# Patient Record
Sex: FEMALE | Race: WHITE | NOT HISPANIC OR LATINO | Employment: PART TIME | URBAN - NONMETROPOLITAN AREA
[De-identification: names, ages, dates, MRNs, and addresses within clinical notes are randomized per-mention and may not be internally consistent; named-entity substitution may affect disease eponyms.]

---

## 2018-01-15 ENCOUNTER — APPOINTMENT (EMERGENCY)
Dept: CT IMAGING | Facility: HOSPITAL | Age: 28
End: 2018-01-15
Payer: COMMERCIAL

## 2018-01-15 ENCOUNTER — HOSPITAL ENCOUNTER (EMERGENCY)
Facility: HOSPITAL | Age: 28
Discharge: HOME | End: 2018-01-15
Admitting: EMERGENCY MEDICINE
Payer: COMMERCIAL

## 2018-01-15 VITALS
DIASTOLIC BLOOD PRESSURE: 68 MMHG | HEART RATE: 91 BPM | HEIGHT: 62 IN | OXYGEN SATURATION: 98 % | BODY MASS INDEX: 42.77 KG/M2 | TEMPERATURE: 98.5 F | SYSTOLIC BLOOD PRESSURE: 106 MMHG | RESPIRATION RATE: 21 BRPM | WEIGHT: 232.44 LBS

## 2018-01-15 DIAGNOSIS — R06.89 DYSPNEA AND RESPIRATORY ABNORMALITY: ICD-10-CM

## 2018-01-15 DIAGNOSIS — R19.7 NAUSEA VOMITING AND DIARRHEA: ICD-10-CM

## 2018-01-15 DIAGNOSIS — R06.00 DYSPNEA AND RESPIRATORY ABNORMALITY: ICD-10-CM

## 2018-01-15 DIAGNOSIS — R11.2 NAUSEA VOMITING AND DIARRHEA: ICD-10-CM

## 2018-01-15 DIAGNOSIS — Z34.92 SECOND TRIMESTER PREGNANCY: ICD-10-CM

## 2018-01-15 DIAGNOSIS — E86.0 DEHYDRATION: Primary | ICD-10-CM

## 2018-01-15 LAB
ALBUMIN SERPL BCP-MCNC: 3.3 G/DL (ref 3.5–5)
ALP SERPL-CCNC: 59 U/L (ref 46–116)
ALT SERPL W P-5'-P-CCNC: 47 U/L (ref 12–78)
ANION GAP SERPL CALCULATED.3IONS-SCNC: 7 MMOL/L (ref 4–13)
AST SERPL W P-5'-P-CCNC: 20 U/L (ref 5–45)
BASOPHILS # BLD MANUAL: 0 THOUSAND/UL (ref 0–0.1)
BASOPHILS NFR MAR MANUAL: 0 % (ref 0–1)
BILIRUB SERPL-MCNC: 0.3 MG/DL (ref 0.2–1)
BILIRUB UR QL STRIP: NEGATIVE
BUN SERPL-MCNC: 6 MG/DL (ref 5–25)
CALCIUM SERPL-MCNC: 8.6 MG/DL (ref 8.3–10.1)
CHLORIDE SERPL-SCNC: 102 MMOL/L (ref 100–108)
CLARITY UR: CLEAR
CO2 SERPL-SCNC: 24 MMOL/L (ref 21–32)
COLOR UR: YELLOW
CREAT SERPL-MCNC: 0.55 MG/DL (ref 0.6–1.3)
EOSINOPHIL # BLD MANUAL: 0 THOUSAND/UL (ref 0–0.4)
EOSINOPHIL NFR BLD MANUAL: 0 % (ref 0–6)
ERYTHROCYTE [DISTWIDTH] IN BLOOD BY AUTOMATED COUNT: 12.8 % (ref 11.6–15.1)
GFR SERPL CREATININE-BSD FRML MDRD: 130 ML/MIN/1.73SQ M
GLUCOSE SERPL-MCNC: 80 MG/DL (ref 65–140)
GLUCOSE UR STRIP-MCNC: NEGATIVE MG/DL
HCT VFR BLD AUTO: 37.4 % (ref 34.8–46.1)
HGB BLD-MCNC: 13.4 G/DL (ref 11.5–15.4)
HGB UR QL STRIP.AUTO: NEGATIVE
HOLD SPECIMEN: NORMAL
KETONES UR STRIP-MCNC: ABNORMAL MG/DL
LEUKOCYTE ESTERASE UR QL STRIP: NEGATIVE
LYMPHOCYTES # BLD AUTO: 0.25 THOUSAND/UL (ref 0.6–4.47)
LYMPHOCYTES # BLD AUTO: 4 % (ref 14–44)
MCH RBC QN AUTO: 30.2 PG (ref 26.8–34.3)
MCHC RBC AUTO-ENTMCNC: 35.8 G/DL (ref 31.4–37.4)
MCV RBC AUTO: 84 FL (ref 82–98)
MONOCYTES # BLD AUTO: 0.25 THOUSAND/UL (ref 0–1.22)
MONOCYTES NFR BLD: 4 % (ref 4–12)
NEUTROPHILS # BLD MANUAL: 5.68 THOUSAND/UL (ref 1.85–7.62)
NEUTS SEG NFR BLD AUTO: 92 % (ref 43–75)
NITRITE UR QL STRIP: NEGATIVE
PH UR STRIP.AUTO: 5.5 [PH] (ref 4.5–8)
PLATELET # BLD AUTO: 211 THOUSANDS/UL (ref 149–390)
PLATELET BLD QL SMEAR: ADEQUATE
PMV BLD AUTO: 9.3 FL (ref 8.9–12.7)
POTASSIUM SERPL-SCNC: 3.5 MMOL/L (ref 3.5–5.3)
PROT SERPL-MCNC: 6.9 G/DL (ref 6.4–8.2)
PROT UR STRIP-MCNC: NEGATIVE MG/DL
RBC # BLD AUTO: 4.43 MILLION/UL (ref 3.81–5.12)
SODIUM SERPL-SCNC: 133 MMOL/L (ref 136–145)
SP GR UR STRIP.AUTO: <=1.005 (ref 1–1.03)
TOTAL CELLS COUNTED SPEC: 100
TROPONIN I SERPL-MCNC: <0.02 NG/ML
UROBILINOGEN UR QL STRIP.AUTO: 0.2 E.U./DL
WBC # BLD AUTO: 6.17 THOUSAND/UL (ref 4.31–10.16)

## 2018-01-15 PROCEDURE — 80053 COMPREHEN METABOLIC PANEL: CPT | Performed by: PHYSICIAN ASSISTANT

## 2018-01-15 PROCEDURE — 96360 HYDRATION IV INFUSION INIT: CPT

## 2018-01-15 PROCEDURE — 84484 ASSAY OF TROPONIN QUANT: CPT | Performed by: PHYSICIAN ASSISTANT

## 2018-01-15 PROCEDURE — 93005 ELECTROCARDIOGRAM TRACING: CPT

## 2018-01-15 PROCEDURE — 85027 COMPLETE CBC AUTOMATED: CPT | Performed by: PHYSICIAN ASSISTANT

## 2018-01-15 PROCEDURE — 81003 URINALYSIS AUTO W/O SCOPE: CPT | Performed by: PHYSICIAN ASSISTANT

## 2018-01-15 PROCEDURE — 96361 HYDRATE IV INFUSION ADD-ON: CPT

## 2018-01-15 PROCEDURE — 85007 BL SMEAR W/DIFF WBC COUNT: CPT | Performed by: PHYSICIAN ASSISTANT

## 2018-01-15 PROCEDURE — 93005 ELECTROCARDIOGRAM TRACING: CPT | Performed by: PHYSICIAN ASSISTANT

## 2018-01-15 PROCEDURE — 71275 CT ANGIOGRAPHY CHEST: CPT

## 2018-01-15 PROCEDURE — 36415 COLL VENOUS BLD VENIPUNCTURE: CPT | Performed by: PHYSICIAN ASSISTANT

## 2018-01-15 PROCEDURE — 99285 EMERGENCY DEPT VISIT HI MDM: CPT

## 2018-01-15 RX ORDER — PYRIDOXINE HCL (VITAMIN B6) 50 MG
50 TABLET ORAL ONCE
Status: COMPLETED | OUTPATIENT
Start: 2018-01-15 | End: 2018-01-15

## 2018-01-15 RX ORDER — PYRIDOXINE HCL (VITAMIN B6) 50 MG
50 TABLET ORAL DAILY
Status: DISCONTINUED | OUTPATIENT
Start: 2018-01-15 | End: 2018-01-15

## 2018-01-15 RX ADMIN — SODIUM CHLORIDE 1000 ML: 0.9 INJECTION, SOLUTION INTRAVENOUS at 12:54

## 2018-01-15 RX ADMIN — IODIXANOL 85 ML: 320 INJECTION, SOLUTION INTRAVASCULAR at 13:22

## 2018-01-15 RX ADMIN — Medication 50 MG: at 13:20

## 2018-01-15 RX ADMIN — DOXYLAMINE SUCCINATE 12.5 MG: 25 TABLET ORAL at 13:20

## 2018-01-15 NOTE — ED NOTES
Pt resting comfortable  Minimal nausea  No complications nor vomiting while eating  Call bell within reach   Family at bedside     Dyllan Johnston, [address] [facility]  01/15/18 6648

## 2018-01-15 NOTE — ED NOTES
Pt was eating upon entering room with no complications  Ambulating to bathroom with no assistance        Chidi Silver RN  01/15/18 1506

## 2018-01-15 NOTE — DISCHARGE INSTRUCTIONS
Acute Nausea and Vomiting, Ambulatory Care   GENERAL INFORMATION:   Acute nausea and vomiting  starts suddenly, gets worse quickly, and lasts a short time  Nausea and vomiting may be caused by pregnancy, alcohol, infection, or medicines  Common related symptoms include the following:   · Fever    · Abdominal swelling    · Pain, tenderness, or a lump in the abdomen    · Splashing sounds heard in your stomach when you move  Seek immediate care for the following symptoms:   · Blood in your vomit or bowel movements    · Sudden, severe pain in your chest and upper abdomen after hard vomiting    · Dizziness, dry mouth, and thirst    · Urinating very little or not at all    · Muscle weakness, leg cramps, and trouble breathing    · A heart beat that is faster than normal    · Vomiting for more than 48 hours  Treatment for acute nausea and vomiting  may include medicines to calm your stomach and stop the vomiting  You may need IV fluids if you are dehydrated  Manage your nausea and vomiting:   · Drink liquids as directed to prevent dehydration  Ask how much liquid to drink each day and which liquids are best for you  You may need to drink an oral rehydration solution (ORS)  ORS contains water, salts, and sugar that are needed to replace the lost body fluids  Ask what kind of ORS to use, how much to drink, and where to get it  · Eat smaller meals, more often  Eat small amounts of food every 2 to 3 hours, even if you are not hungry  Food in your stomach may help decrease your nausea  · Avoid stress  Find ways to relax and manage your stress  Headaches due to stress may cause nausea and vomiting  Get more rest and sleep  Follow up with your healthcare provider as directed:  Write down your questions so you remember to ask them during your visits  CARE AGREEMENT:   You have the right to help plan your care  Learn about your health condition and how it may be treated   Discuss treatment options with your caregivers to decide what care you want to receive  You always have the right to refuse treatment  The above information is an  only  It is not intended as medical advice for individual conditions or treatments  Talk to your doctor, nurse or pharmacist before following any medical regimen to see if it is safe and effective for you  © 2014 [address] is for End User's use only and may not be sold, redistributed or otherwise used for commercial purposes  All illustrations and images included in CareNotes® are the copyrighted property of [employer]  or Ji Garcias  Dehydration   WHAT YOU NEED TO KNOW:   Dehydration is a condition that develops when your body does not have enough fluid  You may become dehydrated if you do not drink enough water or lose too much fluid  Fluid loss may also cause loss of electrolytes (minerals), such as sodium  DISCHARGE INSTRUCTIONS:   Return to the emergency department if:   · You have a seizure  · You are confused or cannot think clearly  · You are extremely sleepy, or another person cannot wake you  · You become dizzy or faint when you stand  · You are not able to urinate  · You have trouble breathing  · You have a fast or irregular heartbeat  · Your hands or feet are cold, or your face is pale  Contact your healthcare provider if:   · You have trouble drinking liquids because you are vomiting  · Your symptoms get worse  · You have a fever  · You feel very weak or tired  · You have questions or concerns about your condition or care  Follow up with your healthcare provider as directed:  Write down your questions so you remember to ask them during your visits  Prevent or manage dehydration:   · Drink liquids as directed  Liquids that contain water, sugar, and minerals can help your body hold in fluid and help prevent dehydration  Drink liquids throughout the day, not just when you feel thirsty  Men should drink about 3 liters (13 eight-ounce cups) of liquid each day  Women should drink about 2 liters (9 eight-ounce cups) of liquid each day  Drink even more liquid if you will be outdoors, in the sun for a long time, or exercising  · Stay cool  Limit the time you spend outdoors during the hottest part of the day  Dress in lightweight clothes  · Keep track of how often you urinate  If you urinate less than usual or your urine is darker, drink more liquids  © 2017 [address] [facility] Information is for End User's use only and may not be sold, redistributed or otherwise used for commercial purposes  All illustrations and images included in CareNotes® are the copyrighted property of [employer]  or Ji Garcias  The above information is an  only  It is not intended as medical advice for individual conditions or treatments  Talk to your doctor, nurse or pharmacist before following any medical regimen to see if it is safe and effective for you  Dyspnea   WHAT YOU NEED TO KNOW:   Dyspnea is breathing difficulty or discomfort  You may have labored, painful, or shallow breathing  You may feel breathless or short of breath  Dyspnea can occur during rest or with activity  You may have dyspnea for a short time, or it might become chronic  Dyspnea is often a symptom of a disease or condition  DISCHARGE INSTRUCTIONS:   Return to the emergency department if:   · Your signs and symptoms are the same or worse within 24 hours of treatment  · You have shaking chills or a fever over 102°F      · You have new pain, pressure, or tightness in your chest      · You have a new or worse cough or wheezing, or you cough up blood  · You feel like you cannot get enough air  · The skin over your ribs or on your neck sinks in when you breathe  · You have a severe headache with vomiting and abdominal pain  · You feel confused or dizzy    Contact your healthcare provider or specialist if:   · You have questions or concerns about your condition or care  Medicines:   · Medicines  may be used to treat the cause of your dyspnea  Medicines may reduce swelling in your airway or decrease extra fluid from around your heart or lungs  Other medicines may be used to decrease anxiety and help you feel calm and relaxed  · Take your medicine as directed  Contact your healthcare provider if you think your medicine is not helping or if you have side effects  Tell him or her if you are allergic to any medicine  Keep a list of the medicines, vitamins, and herbs you take  Include the amounts, and when and why you take them  Bring the list or the pill bottles to follow-up visits  Carry your medicine list with you in case of an emergency  Manage long-term dyspnea:   · Create an action plan  You and your healthcare provider can work together to create a plan for how to handle episodes of dyspnea  The plan can include daily activities, treatment changes, and what to do if you have severe breathing problems  · Lean forward on your elbows when you sit  This helps your lungs expand and may make it easier to breathe  · Use pursed-lip breathing any time you feel short of breath  Breathe in through your nose and then slowly breathe out through your mouth with your lips slightly puckered  It should take you twice as long to breathe out as it did to breathe in  · Do not smoke  Nicotine and other chemicals in cigarettes and cigars can cause lung damage and make it harder to breathe  Ask your healthcare provider for information if you currently smoke and need help to quit  E-cigarettes or smokeless tobacco still contain nicotine  Talk to your healthcare provider before you use these products  · Reach or maintain a healthy weight  Your healthcare provider can help you create a safe weight loss plan if you are overweight  · Exercise as directed    Exercise can help your lungs work more easily  Exercise can also help you lose weight if needed  Try to get at least 30 minutes of exercise most days of the week  Your healthcare provider can help you create an exercise plan that is safe for you  Follow up with your healthcare provider or specialist as directed:  Write down your questions so you remember to ask them during your visits  © 2017 [address]  Information is for End User's use only and may not be sold, redistributed or otherwise used for commercial purposes  All illustrations and images included in CareNotes® are the copyrighted property of [employer]  or [address]  The above information is an  only  It is not intended as medical advice for individual conditions or treatments  Talk to your doctor, nurse or pharmacist before following any medical regimen to see if it is safe and effective for you

## 2018-01-16 LAB
ATRIAL RATE: 91 BPM
P AXIS: 36 DEGREES
PR INTERVAL: 146 MS
QRS AXIS: 48 DEGREES
QRSD INTERVAL: 92 MS
QT INTERVAL: 358 MS
QTC INTERVAL: 440 MS
T WAVE AXIS: 19 DEGREES
VENTRICULAR RATE: 91 BPM

## 2018-01-16 NOTE — ED PROVIDER NOTES
History  Chief Complaint   Patient presents with    Shortness of Breath     C/o Sob for 1 week  Nausea and vomiting started last night   diarrhea  19 weeks pregnant  History provided by:  Patient and parent  Shortness of Breath   Severity:  Mild  Onset quality:  Gradual  Duration:  1 week  Timing:  Intermittent  Progression:  Unchanged  Chronicity:  New  Context: activity and URI (had a cold since being on vacation in early december sx resolved except dry cough persists)    Relieved by:  Rest  Worsened by: Activity and exertion  Ineffective treatments:  None tried  Associated symptoms: vomiting    Associated symptoms: no abdominal pain, no chest pain, no cough, no diaphoresis, no ear pain, no fever, no headaches, no hemoptysis, no rash, no sore throat and no syncope    Risk factors: obesity    Risk factors: no family hx of DVT and no hx of PE/DVT    Risk factors comment:  +19 weeks pregnant  +airplane flight 5 days ago from [place] to [place]  Nausea   The primary symptoms include nausea, vomiting and diarrhea  Primary symptoms do not include fever, fatigue, abdominal pain, melena, jaundice, hematochezia, dysuria, myalgias, arthralgias or rash  Primary symptoms comment: sick contacts at home with uri/bronchitis/diarrhea  The illness began yesterday  The onset was gradual  The problem has been gradually improving  Nausea began yesterday  Associated with: no change with liquids or solids  pt is pregnant but no previous morning sickness  The vomiting began yesterday  Frequency: 2-3 episodes  The diarrhea began yesterday  The diarrhea is semi-solid  The diarrhea occurs 2 to 4 times per day  The illness does not include chills, anorexia, bloating, constipation or back pain  Associated medical issues do not include inflammatory bowel disease or irritable bowel syndrome  Prior to Admission Medications   Prescriptions Last Dose Informant Patient Reported? Taking?    Prenatal Vit-Iron Carbonyl-FA (PRENATAL MULTIVITAMIN) TABS   Yes Yes   Sig: Take 1 tablet by mouth daily      Facility-Administered Medications: None       Past Medical History:   Diagnosis Date    Renal disorder     kidney stone       History reviewed  No pertinent surgical history  History reviewed  No pertinent family history  I have reviewed and agree with the history as documented  Social History   Substance Use Topics    Smoking status: Former Smoker    Smokeless tobacco: Never Used    Alcohol use No        Review of Systems   Constitutional: Negative for activity change, appetite change, chills, diaphoresis, fatigue, fever and unexpected weight change  HENT: Negative for congestion, ear pain, postnasal drip, rhinorrhea, sinus pressure and sore throat  Eyes: Negative for pain, discharge and redness  Respiratory: Positive for shortness of breath  Negative for cough, hemoptysis and chest tightness  Cardiovascular: Negative for chest pain, palpitations, leg swelling and syncope  Gastrointestinal: Positive for diarrhea, nausea and vomiting  Negative for abdominal pain, anorexia, bloating, constipation, hematochezia, jaundice and melena  Genitourinary: Negative for difficulty urinating, dysuria, flank pain, frequency, hematuria and urgency  Musculoskeletal: Negative for arthralgias, back pain and myalgias  Skin: Negative for color change, rash and wound  Allergic/Immunologic: Negative for immunocompromised state  Neurological: Negative for dizziness, tremors, syncope, weakness, numbness and headaches         Physical Exam  ED Triage Vitals [01/15/18 1138]   Temperature Pulse Respirations Blood Pressure SpO2   98 5 °F (36 9 °C) 92 18 115/53 99 %      Temp Source Heart Rate Source Patient Position - Orthostatic VS BP Location FiO2 (%)   Temporal Monitor Sitting Left arm --      Pain Score       No Pain           Orthostatic Vital Signs  Vitals:    01/15/18 1400 01/15/18 1415 01/15/18 1430 01/15/18 1445   BP: 118/60 102/63 106/68   Pulse: 94 91 96 91   Patient Position - Orthostatic VS:   Sitting        Physical Exam   Constitutional: She is oriented to person, place, and time  Vital signs are normal  She appears well-developed and well-nourished  Non-toxic appearance  No distress  HENT:   Head: Normocephalic and atraumatic  Right Ear: Tympanic membrane and external ear normal    Left Ear: Tympanic membrane and external ear normal    Nose: Nose normal  No rhinorrhea  Mouth/Throat: Uvula is midline and oropharynx is clear and moist    Eyes: Conjunctivae, EOM and lids are normal  Pupils are equal, round, and reactive to light  Right eye exhibits no discharge  Left eye exhibits no discharge  Neck: Normal range of motion and full passive range of motion without pain  Neck supple  No JVD present  No thyromegaly present  Cardiovascular: Normal rate, regular rhythm, normal heart sounds and intact distal pulses  No murmur heard  Pulmonary/Chest: Effort normal and breath sounds normal  No accessory muscle usage  No tachypnea  No respiratory distress  She has no wheezes  She has no rales  She exhibits no tenderness  Abdominal: Soft  Normal appearance and bowel sounds are normal  She exhibits no distension  There is no hepatosplenomegaly  There is no tenderness  There is no rebound and no CVA tenderness  No hernia  Musculoskeletal: Normal range of motion  She exhibits no edema or tenderness  Negative krzysztof's sign   Lymphadenopathy:     She has no cervical adenopathy  Neurological: She is alert and oriented to person, place, and time  No cranial nerve deficit or sensory deficit  Skin: Skin is warm, dry and intact  Capillary refill takes less than 2 seconds  No rash noted  She is not diaphoretic  No erythema  No pallor  Psychiatric: She has a normal mood and affect  Her speech is normal and behavior is normal    Nursing note and vitals reviewed        ED Medications  Medications   sodium chloride 0 9 % bolus 1,000 mL (0 mL Intravenous Stopped 1/15/18 1455)   doxylamine (UNISON) tablet 12 5 mg (12 5 mg Oral Given 1/15/18 1320)   pyridoxine (VITAMIN B6) tablet 50 mg (50 mg Oral Given 1/15/18 1320)   iodixanol (VISIPAQUE) 320 MG/ML injection 85 mL (85 mL Intravenous Given 1/15/18 1322)       Diagnostic Studies  Results Reviewed     Procedure Component Value Units Date/Time    [place] draw [[id number]] Collected:  01/15/18 1205    Lab Status:  Final result Specimen:  Blood Updated:  01/15/18 1404    Narrative: The following orders were created for panel order [place] draw  Procedure                               Abnormality         Status                     ---------                               -----------         ------                     Riki Pinks Top on [facility][[id number]]                            Final result               Green / Black tube on hold[[id number]]                        Final result                 Please view results for these tests on the individual orders  UA w Reflex to Microscopic w Reflex to Culture [[id number]]  (Abnormal) Collected:  01/15/18 1330    Lab Status:  Final result Specimen:  Urine Updated:  01/15/18 1343     Color, UA Yellow     Clarity, UA Clear     Specific Gravity, UA <=1 005     pH, UA 5 5     Leukocytes, UA Negative     Nitrite, UA Negative     Protein, UA Negative mg/dl      Glucose, UA Negative mg/dl      Ketones, UA 40 (2+) (A) mg/dl      Urobilinogen, UA 0 2 E U /dl      Bilirubin, UA Negative     Blood, UA Negative    CBC and differential [[id number]]  (Normal) Collected:  01/15/18 1205    Lab Status:  Final result Specimen:  Blood from Arm, Left Updated:  01/15/18 1246     WBC 6 17 Thousand/uL      RBC 4 43 Million/uL      Hemoglobin 13 4 g/dL      Hematocrit 37 4 %      MCV 84 fL      MCH 30 2 pg      MCHC 35 8 g/dL      RDW 12 8 %      MPV 9 3 fL      Platelets 271 Thousands/uL     Narrative: This is an appended report    These results have been appended to a previously verified report  Troponin I [[id number]]  (Normal) Collected:  01/15/18 1205    Lab Status:  Final result Specimen:  Blood from Arm, Left Updated:  01/15/18 1235     Troponin I <0 02 ng/mL     Narrative:         [employer] Chemistry analyzer 99% cutoff is > 0 04 ng/mL in network labs    o cTnI 99% cutoff is useful only when applied to patients in the clinical setting of myocardial ischemia  o cTnI 99% cutoff should be interpreted in the context of clinical history, ECG findings and possibly cardiac imaging to establish correct diagnosis  o cTnI 99% cutoff may be suggestive but clearly not indicative of a coronary event without the clinical setting of myocardial ischemia  Comprehensive metabolic panel [[id number]]  (Abnormal) Collected:  01/15/18 1205    Lab Status:  Final result Specimen:  Blood from Arm, Left Updated:  01/15/18 1230     Sodium 133 (L) mmol/L      Potassium 3 5 mmol/L      Chloride 102 mmol/L      CO2 24 mmol/L      Anion Gap 7 mmol/L      BUN 6 mg/dL      Creatinine 0 55 (L) mg/dL      Glucose 80 mg/dL      Calcium 8 6 mg/dL      AST 20 U/L      ALT 47 U/L      Alkaline Phosphatase 59 U/L      Total Protein 6 9 g/dL      Albumin 3 3 (L) g/dL      Total Bilirubin 0 30 mg/dL      eGFR 130 ml/min/1 73sq m     Narrative:         National Kidney Disease Education Program recommendations are as follows:  GFR calculation is accurate only with a steady state creatinine  Chronic Kidney disease less than 60 ml/min/1 73 sq  meters  Kidney failure less than 15 ml/min/1 73 sq  meters  CTA ED chest PE study   Final Result by Silvestre Flores DO (01/15 1339)      Limited CT pulmonary angiogram without convincing evidence of central emboli  Very limited assessment of the subsegmental vessels  2 mm right middle lobe subpleural nodule    Based on current Fleischner Society 2017 Guidelines on incidental pulmonary nodule, no routine follow-up is needed if the patient is considered low risk for lung cancer  If the patient is considered high risk    for lung cancer, 12 month follow-up non-contrast chest CT is recommended  Workstation performed: [id number]                    Procedures  ECG 12 Lead Documentation  Date/Time: 1/15/2018 12:08 PM  Performed by: Arlette Correa  Authorized by: Arlette Correa     Indications / Diagnosis:  Sob  ECG reviewed by me, the ED Provider: yes    Patient location:  ED  Rate:     ECG rate:  91  Rhythm:     Rhythm: sinus rhythm    Ectopy:     Ectopy: none    QRS:     QRS axis:  Normal  Conduction:     Conduction: normal    ST segments:     ST segments:  Normal  T waves:     T waves: normal             Phone Contacts  ED Phone Contact    ED Course  ED Course as of Jai 15 2204   Mon Jai 15, 2018   1238 WBC: 6 17   1238 Hemoglobin: 13 4   1238 Hematocrit: 37 4   1238 Platelets: 241   3926 Troponin I: <0 02   1238 Sodium: (!) 133   1238 Potassium: 3 5   1238 Chloride: 102   1238 CO2: 24   1238 Anion Gap: 7   1238 BUN: 6   1238 Creatinine: (!) 0 55   1238 Glucose: 80   1238 AST: 20   1238 ALT: 47   1238 eGFR: 130   1238  bpm lower left abdomen    1238 Discussion with pt and parent at bedside shared decision making about benefits and risks of CTA pe study given her sx of dyspnea, with recent plane travel and being pregnant as risk factors not candidate for d-dimer would recommend to proceed with CTA pt and parent understand risks of radiation exposure to mother and baby will LED shield baby as much as possible pt and parent agreeable to proceed with study      1347 WBC: 6 17   1348 Hemoglobin: 13 4   1348 Platelets: 184   4760 Sodium: (!) 133   1348 Potassium: 3 5   1348 Chloride: 102   1348 CO2: 24   1348 Anion Gap: 7   1348 BUN: 6   1348 Creatinine: (!) 0 55   1348 Glucose: 80   1348 eGFR: 130   1348 Troponin I: <0 02   1348 Color, UA: Yellow   1348 Clarity, UA: Clear   1348 Specific Gravity, UA: <=1 005   1348 Leukocytes, UA: Negative   1348 Nitrite, UA: Negative   1348 Protein, UA: Negative   1348 Ketones, UA: (!) 40 (2+)   1348 Blood, UA: Negative   1355 Reassess patient at this time, feeling well just tired  No nausea or vomiting  Had a sandwich and juice tolerated it well  Sats are 100% normal RR and HR and BP  Reviewed labs and CT findings with pt and parent  Will allow pt to have copies of labs done to bring home to her OB in [place] when she returns home later this week                            Mary Lou Devi Criteria for PE    Flowsheet Row Most Recent Value   Adrianne Criteria for PE   Clinical signs and symptoms of DVT  0 Filed at: 01/15/2018 1207   PE is primary diagnosis or equally likely  3 Filed at: 01/15/2018 1207   HR >100  0 Filed at: 01/15/2018 1207   Immobilization at least 3 days or Surgery in the previous 4 weeks  0 Filed at: 01/15/2018 1207   Previous, objectively diagnosed PE or DVT  0 Filed at: 01/15/2018 1207   Hemoptysis  0 Filed at: 01/15/2018 1207   Malignancy with treatment within 6 months or palliative  0 Filed at: 01/15/2018 1207   Mary Lou Devi Criteria Total  3 Filed at: 01/15/2018 1207            MDM  Number of Diagnoses or Management Options  Dehydration: new and requires workup  Dyspnea and respiratory abnormality: new and requires workup  Nausea vomiting and diarrhea: new and requires workup  Second trimester pregnancy:      Amount and/or Complexity of Data Reviewed  Clinical lab tests: ordered and reviewed  Tests in the radiology section of CPT®: ordered and reviewed  Obtain history from someone other than the patient: yes  Review and summarize past medical records: (Not available pt's OB care is in patient's home state of [place])  Discuss the patient with other providers: yes  Independent visualization of images, tracings, or specimens: yes    Patient Progress  Patient progress: stable    CritCare Time    Disposition  Final diagnoses:   Dehydration   Dyspnea and respiratory abnormality   Nausea vomiting and diarrhea   Second trimester pregnancy     Time reflects when diagnosis was documented in both MDM as applicable and the Disposition within this note     Time User Action Codes Description Comment    1/15/2018  2:28 PM Eliverto Meals [E86 0] Dehydration     1/15/2018  2:28 PM Eliverto Meals [R06 00,  R06 89] Dyspnea and respiratory abnormality     1/15/2018  2:28 PM Bonilla Fontan Add [R11 2,  R19 7] Nausea vomiting and diarrhea     1/15/2018  2:28 PM Bonilla Fontan Add [Z34 92] Second trimester pregnancy       ED Disposition     ED Disposition Condition Comment    Discharge  [facility] [place] to [place]/self care  Condition at discharge: Good        Follow-up Information     Follow up With Specialties Details Why Contact Info    Your OBGYN  Schedule an appointment as soon as possible for a visit call your OB for visit as soon as you return home         Discharge Medication List as of 1/15/2018  2:31 PM      START taking these medications    Details   doxylamine (UNISON) 25 MG tablet Take 0 5 tablets by mouth daily at bedtime as needed for nausea, Starting Mon 1/15/2018, Print         CONTINUE these medications which have NOT CHANGED    Details   Prenatal Vit-Iron Carbonyl-FA (PRENATAL MULTIVITAMIN) TABS Take 1 tablet by mouth daily, Historical Med           No discharge procedures on file      ED Provider  Electronically Signed by           Dorota Chino PA-C  01/15/18 0176